# Patient Record
Sex: FEMALE | Race: BLACK OR AFRICAN AMERICAN | NOT HISPANIC OR LATINO | Employment: FULL TIME | URBAN - METROPOLITAN AREA
[De-identification: names, ages, dates, MRNs, and addresses within clinical notes are randomized per-mention and may not be internally consistent; named-entity substitution may affect disease eponyms.]

---

## 2023-06-30 ENCOUNTER — OFFICE VISIT (OUTPATIENT)
Dept: PODIATRY | Facility: CLINIC | Age: 68
End: 2023-06-30

## 2023-06-30 VITALS
HEART RATE: 103 BPM | WEIGHT: 200.88 LBS | RESPIRATION RATE: 18 BRPM | BODY MASS INDEX: 32.28 KG/M2 | SYSTOLIC BLOOD PRESSURE: 124 MMHG | DIASTOLIC BLOOD PRESSURE: 75 MMHG | HEIGHT: 66 IN

## 2023-06-30 DIAGNOSIS — L84 PRE-ULCERATIVE CALLUSES: ICD-10-CM

## 2023-06-30 DIAGNOSIS — L60.8 ACQUIRED DYSMORPHIC TOENAIL: ICD-10-CM

## 2023-06-30 DIAGNOSIS — M79.675 TOE PAIN, LEFT: ICD-10-CM

## 2023-06-30 DIAGNOSIS — B35.1 ONYCHOMYCOSIS OF TOENAIL: ICD-10-CM

## 2023-06-30 DIAGNOSIS — E11.9 CONTROLLED TYPE 2 DIABETES MELLITUS WITHOUT COMPLICATION, WITHOUT LONG-TERM CURRENT USE OF INSULIN: ICD-10-CM

## 2023-06-30 DIAGNOSIS — M20.62 ACQUIRED DEFORMITY OF TOE, LEFT: ICD-10-CM

## 2023-06-30 DIAGNOSIS — E11.9 COMPREHENSIVE DIABETIC FOOT EXAMINATION, TYPE 2 DM, ENCOUNTER FOR: Primary | ICD-10-CM

## 2023-06-30 PROCEDURE — 99203 PR OFFICE/OUTPT VISIT, NEW, LEVL III, 30-44 MIN: ICD-10-PCS | Mod: S$PBB,,, | Performed by: PODIATRIST

## 2023-06-30 PROCEDURE — 99204 OFFICE O/P NEW MOD 45 MIN: CPT | Mod: PBBFAC | Performed by: PODIATRIST

## 2023-06-30 PROCEDURE — 99999 PR PBB SHADOW E&M-NEW PATIENT-LVL IV: ICD-10-PCS | Mod: PBBFAC,,, | Performed by: PODIATRIST

## 2023-06-30 PROCEDURE — 99999 PR PBB SHADOW E&M-NEW PATIENT-LVL IV: CPT | Mod: PBBFAC,,, | Performed by: PODIATRIST

## 2023-06-30 PROCEDURE — 99203 OFFICE O/P NEW LOW 30 MIN: CPT | Mod: S$PBB,,, | Performed by: PODIATRIST

## 2023-06-30 RX ORDER — AMLODIPINE BESYLATE 10 MG/1
10 TABLET ORAL
COMMUNITY
Start: 2023-04-21

## 2023-06-30 RX ORDER — FERROUS SULFATE 325(65) MG
325 TABLET ORAL
COMMUNITY
End: 2023-12-21

## 2023-06-30 RX ORDER — ASPIRIN 81 MG/1
81 TABLET ORAL
COMMUNITY

## 2023-06-30 RX ORDER — ATORVASTATIN CALCIUM 20 MG/1
20 TABLET, FILM COATED ORAL NIGHTLY
COMMUNITY
Start: 2023-06-28

## 2023-06-30 RX ORDER — HYDROCHLOROTHIAZIDE 12.5 MG/1
12.5 CAPSULE ORAL
COMMUNITY
Start: 2022-04-27 | End: 2023-07-04

## 2023-06-30 RX ORDER — METFORMIN HYDROCHLORIDE 500 MG/1
500 TABLET ORAL 2 TIMES DAILY WITH MEALS
COMMUNITY
Start: 2022-04-28 | End: 2025-01-21

## 2023-06-30 RX ORDER — MULTIVITAMIN
TABLET ORAL
COMMUNITY
End: 2023-07-04 | Stop reason: SDUPTHER

## 2023-06-30 RX ORDER — DEXTROSE 4 G
1 TABLET,CHEWABLE ORAL
COMMUNITY
Start: 2021-10-25 | End: 2024-07-20

## 2023-06-30 RX ORDER — PHENYLEPHRINE HCL 10 MG
500 TABLET ORAL
COMMUNITY

## 2023-07-04 PROBLEM — L84 PRE-ULCERATIVE CALLUSES: Status: ACTIVE | Noted: 2023-07-04

## 2023-07-04 PROBLEM — E11.9 CONTROLLED TYPE 2 DIABETES MELLITUS WITHOUT COMPLICATION, WITHOUT LONG-TERM CURRENT USE OF INSULIN: Status: ACTIVE | Noted: 2023-07-04

## 2023-07-04 PROBLEM — M20.62: Status: ACTIVE | Noted: 2023-07-04

## 2023-07-04 NOTE — PROGRESS NOTES
Subjective:       Patient ID: Leonie Muir is a 67 y.o. female.    Chief Complaint: Nail Problem, Diabetes Mellitus, and Toe Pain (left)  Patient presents via referral PCP for diabetic foot exam, pain 2nd digit left foot, nail problem left greater than right foot.  Relates mild pain of the 2nd toe, left great toenail is broken and looks painful but currently no pain.  Denies drainage or bleeding from this area.  States she is been diabetic for a long time, feels it is well controlled, does not know her A1c.  Had a stroke 2022, relates she has been slowly improving, almost back to normal activity.  Did affect her left side.  No previous history of foot sores or infections    Past Medical History:   Diagnosis Date    Diabetes mellitus, type 2     Hyperlipidemia     Hypertension     Stroke      Past Surgical History:   Procedure Laterality Date     SECTION       Family History   Problem Relation Age of Onset    Diabetes Mother     Psychosis Father     Diabetes Sister     Diabetes Brother      Social History     Socioeconomic History    Marital status:    Tobacco Use    Smoking status: Never    Smokeless tobacco: Never   Substance and Sexual Activity    Alcohol use: Not Currently    Drug use: Not Currently    Sexual activity: Not Currently       Current Outpatient Medications   Medication Sig Dispense Refill    amLODIPine (NORVASC) 10 MG tablet Take 10 mg by mouth.      aspirin (ECOTRIN) 81 MG EC tablet Take 81 mg by mouth.      atorvastatin (LIPITOR) 20 MG tablet Take 20 mg by mouth every evening.      blood-glucose meter Misc 1 kit by NOT APPLICABLE route.      calcium carbonate/vitamin D3 (CALCIUM 600 + D,3, ORAL) Take 1 tablet by mouth Daily.      cinnamon bark 500 mg capsule Take 500 mg by mouth.      ELDERBERRY FRUIT ORAL Take by mouth.      metFORMIN (GLUCOPHAGE) 500 MG tablet Take 500 mg by mouth 2 (two) times daily with meals.      ferrous sulfate (FEOSOL) 325 mg (65 mg iron) Tab  "tablet Take 325 mg by mouth with breakfast.       No current facility-administered medications for this visit.     Review of patient's allergies indicates:   Allergen Reactions    Metoprolol Itching and Rash     Pt stated she just started this medication and is itching and has a new rash on her back.        Review of Systems   Cardiovascular:  Negative for leg swelling.   All other systems reviewed and are negative.    Objective:      Vitals:    06/30/23 0953   BP: 124/75   Pulse: 103   Resp: 18   Weight: 91.1 kg (200 lb 14.4 oz)   Height: 5' 5.7" (1.669 m)     Physical Exam  Vitals and nursing note reviewed.   Constitutional:       General: She is not in acute distress.     Appearance: Normal appearance.   Cardiovascular:      Pulses:           Dorsalis pedis pulses are 2+ on the right side and 2+ on the left side.        Posterior tibial pulses are 1+ on the right side and 1+ on the left side.   Pulmonary:      Effort: Pulmonary effort is normal.   Musculoskeletal:         General: Swelling and tenderness present.      Right foot: Normal range of motion. Deformity (2nd digit left) present.      Left foot: Normal range of motion.      Comments: Pain edema 2nd digit left/acquired deformity digit.  Muscle strength is intact against resistance in all planes bilateral feet   Feet:      Right foot:      Protective Sensation: 6 sites tested.  6 sites sensed.      Skin integrity: Ulcer, callus (Painful raised pre ulcerative callus lesion medial 2nd digit left with edema, no skin opening or calor) and dry skin present.      Toenail Condition: Right toenails are abnormally thick. Fungal disease present.     Left foot:      Protective Sensation: 6 sites tested.  6 sites sensed.      Skin integrity: Dry skin (onychogryphosis, onychlysis with fungal involvement, raised, tented damaged hallux nails) present.      Toenail Condition: Left toenails are abnormally thick. Fungal disease present.  Skin:     Capillary Refill: " Capillary refill takes 2 to 3 seconds.   Neurological:      General: No focal deficit present.      Mental Status: She is alert.      Comments: Sensation intact all areas bilateral feet, no paresthesias bilateral feet   Psychiatric:         Mood and Affect: Mood normal.         Behavior: Behavior normal.         Thought Content: Thought content normal.         Judgment: Judgment normal.                          Assessment:       1. Comprehensive diabetic foot examination, type 2 DM, encounter for    2. Controlled type 2 diabetes mellitus without complication, without long-term current use of insulin    3. Acquired deformity of toe, left    4. Toe pain, left    5. Pre-ulcerative calluses - Left Foot    6. Acquired dysmorphic toenail    7. Onychomycosis of toenail        Plan:           Comprehensive diabetic pedal exam performed  Reviewed sensation in feet  Reviewed signs of neuropathy.   Reviewed diabetic education at length and advised patient important for her to know her A1c especially if diabetes is well controlled   Reviewed benefit of controled glucose/diabetes regarding potential foot problems especially neuropathy.    Reviewed deformity 2nd digit, pre ulcerative callus putting her at high risk for developing an open ulcer and infection.  Area was debrided with no skin opening, had a lengthy discussion regarding cushion, padding, wide appropriate shoes to accommodate this area as well as a toe spacer.  Anything used between these toes needs to be soft, avoid moisture, keep the area clean and dry.  This may additionally have occurred due to problem with the left big toenail.  Monitor closely for any changes and contact the office with any concerns  Reviewed appropriate shoes,  especially indoors to protect feet, no flat shoes, slippers or walking in sock or bare feet  Had a lengthy discussion regarding maintenance of dry skin and damaged fungal nails and potential complications  Nails debrided, thickness  reduced.  We discussed multiple topical treatments for skin and nails, needs to be applied few times a week for skin  Discussed treatments for nails as well as soaking regimens.  File nails on a regular basis to keep flat   Reviewed need for daily foot checks and instructed patient to contact the office with any area of redness or swelling which has not improved within 3 days.  Patient was in understanding and agreement with treatment plan.  I counseled the patient on their conditions, implications and medical management.  Instructed patient/family to contact the office with any changes, questions, concerns, worsening of symptoms.   Total face to face time 30 minutes, exam, assessment, treatment, discussion, additional time for review of chart prior to and following appointment and visit documentation, consultation and coordination of care.    Follow up 3 months    This note was created using M*Modal voice recognition software that occasionally misinterpreted phrases or words.

## 2023-09-15 ENCOUNTER — TELEPHONE (OUTPATIENT)
Dept: PODIATRY | Facility: CLINIC | Age: 68
End: 2023-09-15
Payer: MEDICARE

## 2023-09-15 NOTE — TELEPHONE ENCOUNTER
Patient's endocrinologist no longer takes medicaid. Patient would like a referral to see Denise Rasmussen

## 2023-09-19 ENCOUNTER — OFFICE VISIT (OUTPATIENT)
Dept: PODIATRY | Facility: CLINIC | Age: 68
End: 2023-09-19
Payer: MEDICARE

## 2023-09-19 VITALS
HEIGHT: 66 IN | SYSTOLIC BLOOD PRESSURE: 150 MMHG | HEART RATE: 83 BPM | DIASTOLIC BLOOD PRESSURE: 77 MMHG | BODY MASS INDEX: 32.27 KG/M2 | WEIGHT: 200.81 LBS

## 2023-09-19 DIAGNOSIS — B35.1 ONYCHOMYCOSIS OF TOENAIL: ICD-10-CM

## 2023-09-19 DIAGNOSIS — L84 PRE-ULCERATIVE CALLUSES: ICD-10-CM

## 2023-09-19 DIAGNOSIS — E11.9 CONTROLLED TYPE 2 DIABETES MELLITUS WITHOUT COMPLICATION, WITHOUT LONG-TERM CURRENT USE OF INSULIN: ICD-10-CM

## 2023-09-19 DIAGNOSIS — M20.62 ACQUIRED DEFORMITY OF TOE, LEFT: Primary | ICD-10-CM

## 2023-09-19 PROCEDURE — 99214 OFFICE O/P EST MOD 30 MIN: CPT | Mod: PBBFAC | Performed by: PODIATRIST

## 2023-09-19 PROCEDURE — 99999 PR PBB SHADOW E&M-EST. PATIENT-LVL IV: ICD-10-PCS | Mod: PBBFAC,,, | Performed by: PODIATRIST

## 2023-09-19 PROCEDURE — 99213 OFFICE O/P EST LOW 20 MIN: CPT | Mod: S$PBB,,, | Performed by: PODIATRIST

## 2023-09-19 PROCEDURE — 99999 PR PBB SHADOW E&M-EST. PATIENT-LVL IV: CPT | Mod: PBBFAC,,, | Performed by: PODIATRIST

## 2023-09-19 PROCEDURE — 99213 PR OFFICE/OUTPT VISIT, EST, LEVL III, 20-29 MIN: ICD-10-PCS | Mod: S$PBB,,, | Performed by: PODIATRIST

## 2023-09-22 NOTE — PROGRESS NOTES
Subjective:       Patient ID: Leonie Muir is a 67 y.o. female.    Chief Complaint: No chief complaint on file.  Patient presents for follow-up due to type 2 diabetes, pre ulcerative callus.  Patient relates change with insurance/provider and would like a referral to endocrinology, nurse practitioner with [facility].  Patient relates she stays busy.  She used to do more patient care, now she usually visits them a few times a week.  Suffered strokes 2022 and tries to stay active and busy to keep up her strength.  She feels diabetes is well controlled but this is 1 of the reason for referral to endocrinology, she is not quite sure.      Past Medical History:   Diagnosis Date    Diabetes mellitus, type 2     Hyperlipidemia     Hypertension     Stroke      Past Surgical History:   Procedure Laterality Date     SECTION       Family History   Problem Relation Age of Onset    Diabetes Mother     Psychosis Father     Diabetes Sister     Diabetes Brother      Social History     Socioeconomic History    Marital status:    Tobacco Use    Smoking status: Never    Smokeless tobacco: Never   Substance and Sexual Activity    Alcohol use: Not Currently    Drug use: Not Currently    Sexual activity: Not Currently       Current Outpatient Medications   Medication Sig Dispense Refill    amLODIPine (NORVASC) 10 MG tablet Take 10 mg by mouth.      aspirin (ECOTRIN) 81 MG EC tablet Take 81 mg by mouth.      atorvastatin (LIPITOR) 20 MG tablet Take 20 mg by mouth every evening.      blood-glucose meter Misc 1 kit by NOT APPLICABLE route.      calcium carbonate/vitamin D3 (CALCIUM 600 + D,3, ORAL) Take 1 tablet by mouth Daily.      cinnamon bark 500 mg capsule Take 500 mg by mouth.      ELDERBERRY FRUIT ORAL Take by mouth.      ferrous sulfate (FEOSOL) 325 mg (65 mg iron) Tab tablet Take 325 mg by mouth with breakfast.      metFORMIN (GLUCOPHAGE) 500 MG tablet Take 500 mg by mouth 2 (two) times  "daily with meals.       No current facility-administered medications for this visit.     Review of patient's allergies indicates:   Allergen Reactions    Metoprolol Itching and Rash     Pt stated she just started this medication and is itching and has a new rash on her back.        Review of Systems   Cardiovascular:  Negative for leg swelling.   Musculoskeletal:  Negative for gait problem.   All other systems reviewed and are negative.      Objective:      Vitals:    09/19/23 1404   BP: (!) 150/77   Pulse: 83   Weight: 91.1 kg (200 lb 12.8 oz)   Height: 5' 5.7" (1.669 m)     Physical Exam  Vitals and nursing note reviewed.   Constitutional:       General: She is not in acute distress.     Appearance: Normal appearance.   Cardiovascular:      Pulses:           Dorsalis pedis pulses are 2+ on the right side and 2+ on the left side.        Posterior tibial pulses are 1+ on the right side and 1+ on the left side.   Pulmonary:      Effort: Pulmonary effort is normal.   Musculoskeletal:      Right foot: Decreased range of motion. Deformity (2nd digit left) present.      Left foot: Decreased range of motion.   Feet:      Right foot:      Skin integrity: Dry skin present.      Toenail Condition: Right toenails are abnormally thick. Fungal disease present.     Left foot:      Skin integrity: Callus (Preulcerative callus distal hallux left) and dry skin present.      Toenail Condition: Left toenails are abnormally thick. Fungal disease present.  Skin:     Capillary Refill: Capillary refill takes 2 to 3 seconds.   Neurological:      General: No focal deficit present.      Mental Status: She is alert.      Comments: Sensation intact all areas bilateral feet, no paresthesias bilateral feet   Psychiatric:         Behavior: Behavior normal.         Thought Content: Thought content normal.                        Assessment:       1. Acquired deformity of toe, left    2. Pre-ulcerative calluses - Left Foot    3. Controlled type 2 " diabetes mellitus without complication, without long-term current use of insulin    4. Onychomycosis of toenail        Plan:         REFERRAL ENDOCRINOLOGY-Denise Rasmussen      Reviewed diabetic education at length   Reviewed diabetic neuropathy   Reviewed benefit of controled glucose/diabetes regarding potential foot problems especially neuropathy.    We discussed referral to endocrinology  Advised patient with her diabetes in her age she needs to take better care of her feet  Reviewed need for better appropriate shoes,  especially indoors to protect feet, no flat shoes, slippers or walking in sock or bare feet  Reviewed care and maintenance of dry skin and damaged fungal nails and potential complications  Nails debrided, thickness reduced.  Again we discussed multiple topical treatments for nails and explained to patient it is important she tried to treat this due to the amount of damage to the nail bed, surrounding skin which puts her at risk for complications  File nails on a regular basis to keep flat   Reviewed need for daily foot checks and instructed patient to contact the office with any area of redness or swelling which has not improved within 3 days.  Patient was in understanding and agreement with treatment plan.  I counseled the patient on their conditions, implications and medical management.  Instructed patient to contact the office with any changes, questions, concerns, worsening of symptoms.   Total face to face time 20 minutes, exam, assessment, treatment, discussion, additional time for review of chart prior to and following appointment and visit documentation, consultation and coordination of care.    Follow up 3 months    This note was created using M*Modal voice recognition software that occasionally misinterpreted phrases or words.

## 2023-12-19 ENCOUNTER — OFFICE VISIT (OUTPATIENT)
Dept: PODIATRY | Facility: CLINIC | Age: 68
End: 2023-12-19
Payer: MEDICAID

## 2023-12-19 VITALS
DIASTOLIC BLOOD PRESSURE: 84 MMHG | WEIGHT: 205.13 LBS | HEIGHT: 67 IN | HEART RATE: 86 BPM | BODY MASS INDEX: 32.2 KG/M2 | RESPIRATION RATE: 18 BRPM | SYSTOLIC BLOOD PRESSURE: 137 MMHG

## 2023-12-19 DIAGNOSIS — M20.42 HAMMERTOES OF BOTH FEET: ICD-10-CM

## 2023-12-19 DIAGNOSIS — M19.071 ARTHRITIS OF BOTH FEET: Primary | ICD-10-CM

## 2023-12-19 DIAGNOSIS — M20.41 HAMMERTOES OF BOTH FEET: ICD-10-CM

## 2023-12-19 DIAGNOSIS — E11.9 CONTROLLED TYPE 2 DIABETES MELLITUS WITHOUT COMPLICATION, WITHOUT LONG-TERM CURRENT USE OF INSULIN: ICD-10-CM

## 2023-12-19 DIAGNOSIS — B35.1 ONYCHOMYCOSIS OF TOENAIL: ICD-10-CM

## 2023-12-19 DIAGNOSIS — M19.072 ARTHRITIS OF BOTH FEET: Primary | ICD-10-CM

## 2023-12-19 PROCEDURE — 99214 OFFICE O/P EST MOD 30 MIN: CPT | Mod: PBBFAC | Performed by: PODIATRIST

## 2023-12-19 PROCEDURE — 99999 PR PBB SHADOW E&M-EST. PATIENT-LVL IV: CPT | Mod: PBBFAC,,, | Performed by: PODIATRIST

## 2023-12-19 PROCEDURE — 1160F PR REVIEW ALL MEDS BY PRESCRIBER/CLIN PHARMACIST DOCUMENTED: ICD-10-PCS | Mod: CPTII,,, | Performed by: PODIATRIST

## 2023-12-19 PROCEDURE — 99213 PR OFFICE/OUTPT VISIT, EST, LEVL III, 20-29 MIN: ICD-10-PCS | Mod: S$PBB,,, | Performed by: PODIATRIST

## 2023-12-19 PROCEDURE — 3008F PR BODY MASS INDEX (BMI) DOCUMENTED: ICD-10-PCS | Mod: CPTII,,, | Performed by: PODIATRIST

## 2023-12-19 PROCEDURE — 99213 OFFICE O/P EST LOW 20 MIN: CPT | Mod: S$PBB,,, | Performed by: PODIATRIST

## 2023-12-19 PROCEDURE — 1159F PR MEDICATION LIST DOCUMENTED IN MEDICAL RECORD: ICD-10-PCS | Mod: CPTII,,, | Performed by: PODIATRIST

## 2023-12-19 PROCEDURE — 1101F PR PT FALLS ASSESS DOC 0-1 FALLS W/OUT INJ PAST YR: ICD-10-PCS | Mod: CPTII,,, | Performed by: PODIATRIST

## 2023-12-19 PROCEDURE — 1126F PR PAIN SEVERITY QUANTIFIED, NO PAIN PRESENT: ICD-10-PCS | Mod: CPTII,,, | Performed by: PODIATRIST

## 2023-12-19 PROCEDURE — 3075F PR MOST RECENT SYSTOLIC BLOOD PRESS GE 130-139MM HG: ICD-10-PCS | Mod: CPTII,,, | Performed by: PODIATRIST

## 2023-12-19 PROCEDURE — 3079F PR MOST RECENT DIASTOLIC BLOOD PRESSURE 80-89 MM HG: ICD-10-PCS | Mod: CPTII,,, | Performed by: PODIATRIST

## 2023-12-19 PROCEDURE — 99999 PR PBB SHADOW E&M-EST. PATIENT-LVL IV: ICD-10-PCS | Mod: PBBFAC,,, | Performed by: PODIATRIST

## 2023-12-19 PROCEDURE — 3288F PR FALLS RISK ASSESSMENT DOCUMENTED: ICD-10-PCS | Mod: CPTII,,, | Performed by: PODIATRIST

## 2023-12-19 RX ORDER — HYDROCODONE BITARTRATE AND ACETAMINOPHEN 5; 325 MG/1; MG/1
1 TABLET ORAL EVERY 6 HOURS
COMMUNITY
Start: 2023-11-29

## 2023-12-19 RX ORDER — FERROUS FUMARATE 324(106)MG
1 TABLET ORAL
COMMUNITY
Start: 2023-10-03

## 2023-12-20 ENCOUNTER — TELEPHONE (OUTPATIENT)
Dept: PODIATRY | Facility: CLINIC | Age: 68
End: 2023-12-20
Payer: MEDICARE

## 2023-12-21 ENCOUNTER — TELEPHONE (OUTPATIENT)
Dept: PODIATRY | Facility: CLINIC | Age: 68
End: 2023-12-21
Payer: MEDICARE

## 2023-12-21 NOTE — PROGRESS NOTES
Subjective:       Patient ID: Leonie Muir is a 68 y.o. female.    Chief Complaint: Follow-up, Callouses, and Diabetes Mellitus  Patient presents for follow-up due to type 2 diabetes, pre ulcerative callus distal left hallux.  Patient relates area has been much better.  Has been applying Vicks vapor rub to nails and callus as well  Relates diabetes is unchanged.  Saw Denise Rasmussen, NP/endocrinology and was told her diabetes is well-controlled, the no medication changes and patient was pleased.  Denies burning or tingling in feet.  No foot pain today  Strokes 2022       Past Medical History:   Diagnosis Date    Diabetes mellitus, type 2     Hyperlipidemia     Hypertension     Stroke      Past Surgical History:   Procedure Laterality Date     SECTION       Family History   Problem Relation Age of Onset    Diabetes Mother     Psychosis Father     Diabetes Sister     Diabetes Brother      Social History     Socioeconomic History    Marital status:    Tobacco Use    Smoking status: Never    Smokeless tobacco: Never   Substance and Sexual Activity    Alcohol use: Not Currently    Drug use: Not Currently    Sexual activity: Not Currently       Current Outpatient Medications   Medication Sig Dispense Refill    amLODIPine (NORVASC) 10 MG tablet Take 10 mg by mouth.      aspirin (ECOTRIN) 81 MG EC tablet Take 81 mg by mouth.      atorvastatin (LIPITOR) 20 MG tablet Take 20 mg by mouth every evening.      blood-glucose meter Misc 1 kit by NOT APPLICABLE route.      cinnamon bark 500 mg capsule Take 500 mg by mouth.      ELDERBERRY FRUIT ORAL Take by mouth.      FERROCITE 324 mg (106 mg iron) Tab Take 1 tablet by mouth 3 (three) times a week.      HYDROcodone-acetaminophen (NORCO) 5-325 mg per tablet Take 1 tablet by mouth every 6 (six) hours.      metFORMIN (GLUCOPHAGE) 500 MG tablet Take 500 mg by mouth 2 (two) times daily with meals.       No current facility-administered medications for this  "visit.     Review of patient's allergies indicates:   Allergen Reactions    Metoprolol Itching and Rash     Pt stated she just started this medication and is itching and has a new rash on her back.        Review of Systems   Cardiovascular:  Positive for leg swelling.   Musculoskeletal:  Negative for gait problem.   All other systems reviewed and are negative.      Objective:      Vitals:    12/19/23 0851   BP: 137/84   Pulse: 86   Resp: 18   Weight: 93 kg (205 lb 1.6 oz)   Height: 5' 7" (1.702 m)     Physical Exam  Vitals and nursing note reviewed.   Constitutional:       General: She is not in acute distress.     Appearance: Normal appearance.   Cardiovascular:      Pulses:           Dorsalis pedis pulses are 2+ on the right side and 2+ on the left side.        Posterior tibial pulses are 1+ on the right side and 1+ on the left side.   Pulmonary:      Effort: Pulmonary effort is normal.   Musculoskeletal:      Right foot: Decreased range of motion (Significant limitation range of motion 1st MPJ bilateral, hammertoes bilateral/2nd digit rigid bilateral, arthritic and degenerative changes forefoot bilateral). Deformity present.      Left foot: Decreased range of motion. Deformity present.   Feet:      Right foot:      Skin integrity: Dry skin present.      Toenail Condition: Right toenails are abnormally thick. Fungal disease present.     Left foot:      Skin integrity: Callus (Preulcerative callus distal hallux left) and dry skin present.      Toenail Condition: Left toenails are abnormally thick. Fungal disease present.  Skin:     Capillary Refill: Capillary refill takes 2 to 3 seconds.   Neurological:      General: No focal deficit present.      Mental Status: She is alert.      Comments: Sensation intact all areas bilateral feet, no paresthesias bilateral feet   Psychiatric:         Behavior: Behavior normal.         Thought Content: Thought content normal.                                  Assessment:       1. " Arthritis of both feet    2. Hammertoes of both feet    3. Controlled type 2 diabetes mellitus without complication, without long-term current use of insulin    4. Onychomycosis of toenail          Plan:         Reviewed arthritis in feet, limited range of motion big toe joints and hammertoes at length and explained this is what contributed to the callus on the tip of the left great toe.  At this time it is completely resolved on this toe.  Pointed out along the medial right great toe thickened skin starting to develop a callus in this location, make sure to address this with the Vicks vapor rub as well.  Highly recommend it be applied twice daily over the next few months  Reviewed potential complications regarding these areas since they are due to pressure  Had a lengthy discussion regarding care and maintenance of skin  Reviewed shoe gear at length both indoors and outdoors  Nails debrided, thickness reduced  Reviewed diabetic education pertaining to feet and monitoring for any sensations of early neuropathy  Reviewed need for daily foot checks and instructed patient to contact the office with any area of redness or swelling which has not improved within a few days.  Patient was in understanding and agreement with treatment plan.  I counseled the patient on their conditions, implications and medical management.  Instructed patient to contact the office with any changes, questions, concerns, worsening of symptoms.   Total face to face time 20 minutes, exam, assessment, treatment, discussion, additional time for review of chart prior to and following appointment and visit documentation, consultation and coordination of care.    Follow up 3 months    This note was created using M*Modal voice recognition software that occasionally misinterpreted phrases or words.

## 2023-12-21 NOTE — TELEPHONE ENCOUNTER
Patient requesting name of NP that she sees for endocrinology. Denise Rasmussen was given with phone number.

## 2023-12-21 NOTE — TELEPHONE ENCOUNTER
I reviewed patient's chart and I have never prescribed pain medication for her.  The last prescription she received was from her dentist on 11/29.  I can not refill this medication.  Thanks

## 2024-09-09 ENCOUNTER — TELEPHONE (OUTPATIENT)
Dept: PODIATRY | Facility: CLINIC | Age: 69
End: 2024-09-09
Payer: MEDICARE

## 2024-09-09 NOTE — TELEPHONE ENCOUNTER
Patient was scheduled incorrectly with Dr. Tate Vo. She typically sees Dr. Cheryl Vo. Patient was moved to Dr. Luna's schedule and has an appointments scheduled tomorrow, 09/10 at 2:15 p.m. [facility] 09/09/2024

## 2024-09-09 NOTE — TELEPHONE ENCOUNTER
----- Message from Tate Vo DPM sent at 9/8/2024  8:10 PM CDT -----  Patient is on my schedule and it says for nails this is Cheryl's patient this is not anybody I have ever seen and would recommend changing her to Cheryl schedule.

## 2025-04-22 ENCOUNTER — OFFICE VISIT (OUTPATIENT)
Dept: PODIATRY | Facility: CLINIC | Age: 70
End: 2025-04-22
Payer: MEDICARE

## 2025-04-22 VITALS
WEIGHT: 200.63 LBS | HEIGHT: 67 IN | SYSTOLIC BLOOD PRESSURE: 137 MMHG | RESPIRATION RATE: 18 BRPM | BODY MASS INDEX: 31.49 KG/M2 | DIASTOLIC BLOOD PRESSURE: 74 MMHG | HEART RATE: 86 BPM

## 2025-04-22 DIAGNOSIS — E11.9 CONTROLLED TYPE 2 DIABETES MELLITUS WITHOUT COMPLICATION, WITHOUT LONG-TERM CURRENT USE OF INSULIN: ICD-10-CM

## 2025-04-22 DIAGNOSIS — L84 PRE-ULCERATIVE CALLUSES: ICD-10-CM

## 2025-04-22 DIAGNOSIS — M20.41 HAMMERTOES OF BOTH FEET: ICD-10-CM

## 2025-04-22 DIAGNOSIS — M19.071 ARTHRITIS OF BOTH FEET: ICD-10-CM

## 2025-04-22 DIAGNOSIS — E11.9 COMPREHENSIVE DIABETIC FOOT EXAMINATION, TYPE 2 DM, ENCOUNTER FOR: Primary | ICD-10-CM

## 2025-04-22 DIAGNOSIS — M20.5X1 ACQUIRED ADDUCTOVARUS ROTATION OF TOE OF RIGHT FOOT: ICD-10-CM

## 2025-04-22 DIAGNOSIS — M19.072 ARTHRITIS OF BOTH FEET: ICD-10-CM

## 2025-04-22 DIAGNOSIS — M20.42 HAMMERTOES OF BOTH FEET: ICD-10-CM

## 2025-04-22 DIAGNOSIS — B35.1 ONYCHOMYCOSIS OF TOENAIL: ICD-10-CM

## 2025-04-22 PROCEDURE — 1160F RVW MEDS BY RX/DR IN RCRD: CPT | Mod: CPTII,S$GLB,, | Performed by: PODIATRIST

## 2025-04-22 PROCEDURE — 1101F PT FALLS ASSESS-DOCD LE1/YR: CPT | Mod: CPTII,S$GLB,, | Performed by: PODIATRIST

## 2025-04-22 PROCEDURE — 99214 OFFICE O/P EST MOD 30 MIN: CPT | Mod: S$GLB,,, | Performed by: PODIATRIST

## 2025-04-22 PROCEDURE — 1126F AMNT PAIN NOTED NONE PRSNT: CPT | Mod: CPTII,S$GLB,, | Performed by: PODIATRIST

## 2025-04-22 PROCEDURE — 3078F DIAST BP <80 MM HG: CPT | Mod: CPTII,S$GLB,, | Performed by: PODIATRIST

## 2025-04-22 PROCEDURE — 3075F SYST BP GE 130 - 139MM HG: CPT | Mod: CPTII,S$GLB,, | Performed by: PODIATRIST

## 2025-04-22 PROCEDURE — 4010F ACE/ARB THERAPY RXD/TAKEN: CPT | Mod: CPTII,S$GLB,, | Performed by: PODIATRIST

## 2025-04-22 PROCEDURE — 1159F MED LIST DOCD IN RCRD: CPT | Mod: CPTII,S$GLB,, | Performed by: PODIATRIST

## 2025-04-22 PROCEDURE — 3288F FALL RISK ASSESSMENT DOCD: CPT | Mod: CPTII,S$GLB,, | Performed by: PODIATRIST

## 2025-04-22 PROCEDURE — 3008F BODY MASS INDEX DOCD: CPT | Mod: CPTII,S$GLB,, | Performed by: PODIATRIST

## 2025-04-22 PROCEDURE — 99999 PR PBB SHADOW E&M-EST. PATIENT-LVL IV: CPT | Mod: PBBFAC,,, | Performed by: PODIATRIST

## 2025-04-22 RX ORDER — METOPROLOL SUCCINATE 25 MG/1
25 TABLET, EXTENDED RELEASE ORAL
COMMUNITY
Start: 2025-04-15

## 2025-04-22 RX ORDER — OLMESARTAN MEDOXOMIL 20 MG/1
20 TABLET ORAL
COMMUNITY
Start: 2024-05-28

## 2025-04-22 RX ORDER — TIRZEPATIDE 2.5 MG/.5ML
2.5 INJECTION, SOLUTION SUBCUTANEOUS
COMMUNITY
Start: 2025-04-17 | End: 2025-04-22

## 2025-04-22 RX ORDER — FLASH GLUCOSE SCANNING READER
EACH MISCELLANEOUS
COMMUNITY
Start: 2025-03-19 | End: 2025-04-22 | Stop reason: SDUPTHER

## 2025-04-22 RX ORDER — GLIMEPIRIDE 2 MG/1
2 TABLET ORAL EVERY MORNING
COMMUNITY

## 2025-04-22 RX ORDER — CODEINE PHOSPHATE AND GUAIFENESIN 10; 100 MG/5ML; MG/5ML
10 SOLUTION ORAL EVERY 6 HOURS PRN
COMMUNITY
Start: 2025-02-04 | End: 2025-04-22

## 2025-04-22 RX ORDER — DAPAGLIFLOZIN 10 MG/1
10 TABLET, FILM COATED ORAL
COMMUNITY
Start: 2024-05-28

## 2025-04-22 RX ORDER — PREDNISONE 20 MG/1
20 TABLET ORAL 2 TIMES DAILY
COMMUNITY
Start: 2025-02-04 | End: 2025-04-22

## 2025-04-22 RX ORDER — AMLODIPINE AND OLMESARTAN MEDOXOMIL 10; 20 MG/1; MG/1
1 TABLET ORAL
COMMUNITY
End: 2025-04-22

## 2025-04-22 RX ORDER — ATORVASTATIN CALCIUM 20 MG/1
TABLET, FILM COATED ORAL
COMMUNITY
Start: 2025-03-18

## 2025-04-22 RX ORDER — FLASH GLUCOSE SENSOR
KIT MISCELLANEOUS
COMMUNITY
Start: 2025-03-19

## 2025-04-22 NOTE — PROGRESS NOTES
"Subjective:       Patient ID: Leonie Muir is a 69 y.o. female.    Chief Complaint: Diabetic Foot Exam  Patient presents for annual diabetic foot exam, problem/callus 5th digit right  Has been using a corn patch on it, still has a lot of hard skin which gets sore in her tennis shoes but not painful  She has not been wearing any other shoes but her tennis shoes  Relates diabetes has been high, does not know her A1c but is following up Denise Rasmussen, NP/endocrinology, has been some medication changes starting a new "shot, and is now using a Ernesto monitoring system  Has been diabetic for about 3 years  Tries to stay as active as possible, has not been able to work since her stroke  Denies burning or tingling in feet  No foot pain today      Past Medical History:   Diagnosis Date    Diabetes mellitus, type 2     Hyperlipidemia     Hypertension     Stroke 2022     Past Surgical History:   Procedure Laterality Date     SECTION       Family History   Problem Relation Name Age of Onset    Diabetes Mother      Psychosis Father      Diabetes Sister      Diabetes Brother       Social History     Socioeconomic History    Marital status:    Tobacco Use    Smoking status: Never    Smokeless tobacco: Never   Substance and Sexual Activity    Alcohol use: Not Currently    Drug use: Not Currently    Sexual activity: Not Currently       Current Outpatient Medications   Medication Sig Dispense Refill    amLODIPine (NORVASC) 10 MG tablet Take 10 mg by mouth.      aspirin (ECOTRIN) 81 MG EC tablet Take 81 mg by mouth.      atorvastatin (LIPITOR) 20 MG tablet = 1 tab, Oral, HS, # 90 tab, 1 Refill(s), Maintenance, Pharmacy: [facility], 172.7, cm, 25 8:36:00 CDT, Height/Length Measured, 93.2, kg, 25 8:36:00 CDT, Weight Dosing      blood-glucose sensor (FREESTYLE ERNESTO 2 PLUS SENSOR [facility] [place] [facility]) Freestyle Ernesto 2 Sensors, See Instructions, Freestyle Ernesto 2 Sensors  14 day use  Diagnosis Type 2 Diabetes " E11.9, # 6 EA, 4 Refill(s), Pharmacy: [facility], 172.7, cm, 11/04/24 8:21:00 CST, Height/Length Measured, 92.1, kg, 11/04/24 8:21:00 CST, Weight Dosing      cinnamon bark 500 mg capsule Take 500 mg by mouth.      dapagliflozin propanediol (FARXIGA) 10 mg tablet 10 mg.      ELDERBERRY FRUIT ORAL Take by mouth.      flash glucose scanning reader (FREESTYLE SOPHIA 2 READER MISC) Freestyle Sophia 2 [place]/KIT, See Instructions, Freestyle Sophia 2 reader/KIT  Patient to test glucose  Diagnosis Type 2 diabets, # 1 EA, 0 Refill(s), Pharmacy: [facility], 172.7, cm, 11/04/24 8:21:00 CST, Height/Length Measured, 92.1, kg, 11/04/24 8:21:00 CST, Weight Dosing      FREESTYLE SOPHIA 2 SENSOR Kit USE 1 SENSOR AND CHANGE EVERY 14 DAYS      glimepiride (AMARYL) 2 MG tablet Take 2 mg by mouth every morning.      metoprolol succinate (TOPROL-XL) 25 MG 24 hr tablet Take 25 mg by mouth.      olmesartan (BENICAR) 20 MG tablet 20 mg.      amlodipine-olmesartan (JIA) 10-20 mg per tablet Take 1 tablet by mouth. (Patient not taking: Reported on 4/22/2025)      atorvastatin (LIPITOR) 20 MG tablet Take 20 mg by mouth every evening. (Patient not taking: Reported on 4/22/2025)      blood-glucose meter Misc 1 kit by NOT APPLICABLE route.      FERROCITE 324 mg (106 mg iron) Tab Take 1 tablet by mouth 3 (three) times a week. (Patient not taking: Reported on 4/22/2025)      FREESTYLE SOPHIA 2 READER Misc USE TO TEST BLOOD SUGAR (Patient not taking: Reported on 4/22/2025)      guaiFENesin-codeine 100-10 mg/5 ml (TUSSI-ORGANIDIN NR)  mg/5 mL syrup Take 10 mLs by mouth every 6 (six) hours as needed. (Patient not taking: Reported on 4/22/2025)      HYDROcodone-acetaminophen (NORCO) 5-325 mg per tablet Take 1 tablet by mouth every 6 (six) hours. (Patient not taking: Reported on 4/22/2025)      metFORMIN (GLUCOPHAGE) 500 MG tablet Take 500 mg by mouth 2 (two) times daily with meals.      predniSONE (DELTASONE) 20 MG tablet Take 20  "mg by mouth 2 (two) times daily. (Patient not taking: Reported on 4/22/2025)      tirzepatide (MOUNJARO) 2.5 mg/0.5 mL PnIj 2.5 mg. (Patient not taking: Reported on 4/22/2025)       No current facility-administered medications for this visit.     Review of patient's allergies indicates:   Allergen Reactions    Metoprolol Itching and Rash     Pt stated she just started this medication and is itching and has a new rash on her back.        Review of Systems   Cardiovascular:  Negative for leg swelling.   Musculoskeletal:  Positive for gait problem.        Cane   Neurological:  Positive for weakness.   All other systems reviewed and are negative.      Objective:      Vitals:    04/22/25 0830   BP: 137/74   Pulse: 86   Resp: 18   Weight: 91 kg (200 lb 9.9 oz)   Height: 5' 7" (1.702 m)     Physical Exam  Vitals and nursing note reviewed.   Constitutional:       General: She is not in acute distress.     Appearance: Normal appearance.   Cardiovascular:      Pulses:           Dorsalis pedis pulses are 2+ on the right side and 2+ on the left side.        Posterior tibial pulses are 1+ on the right side and 1+ on the left side.   Pulmonary:      Effort: Pulmonary effort is normal.   Musculoskeletal:      Right foot: Decreased range of motion (Significant limitation range of motion 1st MPJ bilateral, hammertoes bilateral/2nd digit rigid bilateral, arthritic and degenerative changes forefoot bilateral). Deformity (Adductovarus deformity 5th digit right) present.      Left foot: Decreased range of motion. Deformity present.   Feet:      Right foot:      Protective Sensation: 5 sites tested.  5 sites sensed.      Skin integrity: Callus (Preulcerative callus lateral 5th PIPJ) and dry skin present.      Toenail Condition: Right toenails are abnormally thick. Fungal disease present.     Left foot:      Protective Sensation: 5 sites tested.  5 sites sensed.      Skin integrity: Dry skin present.      Toenail Condition: Left toenails " are abnormally thick. Fungal disease present.  Skin:     Capillary Refill: Capillary refill takes 2 to 3 seconds.   Neurological:      General: No focal deficit present.      Mental Status: She is alert.      Comments: Sensation intact all areas bilateral feet, no pain/paresthesias bilateral feet   Psychiatric:         Behavior: Behavior normal.         Thought Content: Thought content normal.                         FROM CHEMISTRY PANEL [facility] 03/18/2025  Hgb A1c [0.0-5.9 %] 8.2 % 2  *H*  (3/18/25 9:08 AM)            Assessment:       1. Comprehensive diabetic foot examination, type 2 DM, encounter for    2. Controlled type 2 diabetes mellitus without complication, without long-term current use of insulin    3. Acquired adductovarus rotation of toe of right foot    4. Pre-ulcerative calluses - Left Foot    5. Arthritis of both feet    6. Hammertoes of both feet    7. Onychomycosis of toenail            Plan:           Comprehensive diabetic pedal exam performed  Reviewed good sensation in feet  We had a lengthy discussion regarding neuropathy, she is at higher risk with uncontrolled neuropathy, however she has only been diabetic for 3 years and advised patient if her nurse practitioner work at getting her A1c down she will have less chance of developing any complication of diabetic neuropathy  We did reviewed signs and symptoms to monitor for   Reviewed benefit of controled glucose/diabetes regarding potential foot problems especially healing and neuropathy  Reviewed A1c and patient understands diet changes need to be made in combination with medication for best results          Reviewed arthritis in feet, hammertoes  Reviewed rotation deformity 5th digit right and pressure causing a pre ulcerative callus  Explained what this means, an area of repetitive pressure/skin problem at risk for bruising or developing an open sore  Instructed patient to discontinue acid patches in this area  Area was debrided  with no complications at this time but we discussed at length what to monitor for regarding any changes  Light wrap of Coban applied and dispensed sample of Coban 2 patient  If this is comfortable apply light wrap of Coban each morning, always remove at night, this will help to prevent pressure  Pointed out to patient the callus cushion has caused circular area of depression/unusual shape due to the skin pushing through the donut hole part of the callus cushion which is supposed to offload pressure  It is recommended she discontinue these for nail  We reviewed topical treatment to the area at night so it has all evening to absorbed into the skin and dry before applying socks and shoes and next morning  Had a lengthy discussion regarding appropriate tennis shoe, wider toe box  Patient is trying to get diabetic shoes also  Instructed to use over-the-counter Voltaren gel/arthritic pain cream over this joint as directed for any discomfort regarding the 5th digit right foot, arthritis in her feet or hammertoes   Reviewed care and maintenance of skin  Reviewed better care and maintenance of nails  Reviewed potential complications  Nails debrided, thickness reduced  Reviewed diabetic education pertaining to feet  Reviewed need for daily foot checks and instructed patient to contact the office with any area of concern which has not improved in a few days  Patient was in understanding and agreement with treatment plan.  I counseled the patient on their conditions, implications and medical management.  Instructed patient to contact the office with any changes, questions, concerns, worsening of symptoms.   Total face to face time 30 minutes, exam, assessment, treatment, discussion, additional time for review of chart prior to and following appointment and visit documentation, consultation and coordination of care.    Follow up 3 months    This note was created using M*Modal voice recognition software that occasionally  misinterpreted phrases or words.

## 2025-07-29 ENCOUNTER — OFFICE VISIT (OUTPATIENT)
Dept: PODIATRY | Facility: CLINIC | Age: 70
End: 2025-07-29
Payer: MEDICAID

## 2025-07-29 VITALS
HEART RATE: 91 BPM | WEIGHT: 200.63 LBS | DIASTOLIC BLOOD PRESSURE: 77 MMHG | SYSTOLIC BLOOD PRESSURE: 124 MMHG | BODY MASS INDEX: 31.42 KG/M2

## 2025-07-29 DIAGNOSIS — M20.42 HAMMERTOES OF BOTH FEET: ICD-10-CM

## 2025-07-29 DIAGNOSIS — E11.9 CONTROLLED TYPE 2 DIABETES MELLITUS WITHOUT COMPLICATION, WITHOUT LONG-TERM CURRENT USE OF INSULIN: ICD-10-CM

## 2025-07-29 DIAGNOSIS — M20.5X1 ACQUIRED ADDUCTOVARUS ROTATION OF TOE OF RIGHT FOOT: Primary | ICD-10-CM

## 2025-07-29 DIAGNOSIS — M20.41 HAMMERTOES OF BOTH FEET: ICD-10-CM

## 2025-07-29 DIAGNOSIS — B35.1 ONYCHOMYCOSIS OF TOENAIL: ICD-10-CM

## 2025-07-29 DIAGNOSIS — L84 PRE-ULCERATIVE CALLUSES: ICD-10-CM

## 2025-07-29 PROCEDURE — 99214 OFFICE O/P EST MOD 30 MIN: CPT | Mod: PBBFAC | Performed by: PODIATRIST

## 2025-07-29 PROCEDURE — 99213 OFFICE O/P EST LOW 20 MIN: CPT | Mod: S$PBB,,, | Performed by: PODIATRIST

## 2025-07-29 PROCEDURE — 3008F BODY MASS INDEX DOCD: CPT | Mod: CPTII,,, | Performed by: PODIATRIST

## 2025-07-29 PROCEDURE — 3074F SYST BP LT 130 MM HG: CPT | Mod: CPTII,,, | Performed by: PODIATRIST

## 2025-07-29 PROCEDURE — 4010F ACE/ARB THERAPY RXD/TAKEN: CPT | Mod: CPTII,,, | Performed by: PODIATRIST

## 2025-07-29 PROCEDURE — 1101F PT FALLS ASSESS-DOCD LE1/YR: CPT | Mod: CPTII,,, | Performed by: PODIATRIST

## 2025-07-29 PROCEDURE — 99999 PR PBB SHADOW E&M-EST. PATIENT-LVL IV: CPT | Mod: PBBFAC,,, | Performed by: PODIATRIST

## 2025-07-29 PROCEDURE — 3078F DIAST BP <80 MM HG: CPT | Mod: CPTII,,, | Performed by: PODIATRIST

## 2025-07-29 PROCEDURE — 1159F MED LIST DOCD IN RCRD: CPT | Mod: CPTII,,, | Performed by: PODIATRIST

## 2025-07-29 PROCEDURE — 3288F FALL RISK ASSESSMENT DOCD: CPT | Mod: CPTII,,, | Performed by: PODIATRIST

## 2025-07-29 PROCEDURE — 1126F AMNT PAIN NOTED NONE PRSNT: CPT | Mod: CPTII,,, | Performed by: PODIATRIST

## 2025-07-29 RX ORDER — BACLOFEN 5 MG/1
5 TABLET ORAL
COMMUNITY
Start: 2025-07-07

## 2025-07-29 RX ORDER — TIRZEPATIDE 5 MG/.5ML
INJECTION, SOLUTION SUBCUTANEOUS
COMMUNITY
Start: 2025-05-27

## 2025-07-29 NOTE — PROGRESS NOTES
Subjective:       Patient ID: Leonie Muir is a 69 y.o. female.    Chief Complaint: Diabetes Mellitus, Follow-up, and Toe Pain    CHIEF COMPLAINT:  - Follow-up for diabetic foot care and diabetic shoes, pain 5th digit right foot    HPI:  Leonie presents for follow-up regarding diabetes management. She reports her diabetes is well-controlled. She denies any recent labs but will be seeing this medical assistant next week for this. She denies any burning or tingling sensations in her feet. She has been wearing tennis shoes most of the time and inquires about obtaining diabetic shoes. She is currently under the care of a nurse practitioner specializing in diabetes management and plans to ask about diabetic shoes at her upcoming appointment next week.      ROS:  Musculoskeletal: -limb pain      Past Medical History:   Diagnosis Date    Diabetes mellitus, type 2     Hyperlipidemia     Hypertension     Stroke 2022     Past Surgical History:   Procedure Laterality Date     SECTION       Family History   Problem Relation Name Age of Onset    Diabetes Mother      Psychosis Father      Diabetes Sister      Diabetes Brother       Social History     Socioeconomic History    Marital status:    Tobacco Use    Smoking status: Never    Smokeless tobacco: Never   Substance and Sexual Activity    Alcohol use: Not Currently    Drug use: Not Currently    Sexual activity: Not Currently       Current Outpatient Medications   Medication Sig Dispense Refill    amLODIPine (NORVASC) 10 MG tablet Take 10 mg by mouth.      aspirin (ECOTRIN) 81 MG EC tablet Take 81 mg by mouth.      atorvastatin (LIPITOR) 20 MG tablet = 1 tab, Oral, HS, # 90 tab, 1 Refill(s), Maintenance, Pharmacy: [facility], 172.7, cm, 25 8:36:00 CDT, Height/Length Measured, 93.2, kg, 25 8:36:00 CDT, Weight Dosing      baclofen (LIORESAL) 5 mg Tab tablet Take 5 mg by mouth.      blood-glucose sensor (FREESTYLE SOPHIA 2 PLUS SENSOR [facility])  Freestyle [place] 2 Sensors, See Instructions, Freestyle Ernesto 2 Sensors  14 day use  Diagnosis Type 2 Diabetes E11.9, # 6 EA, 4 Refill(s), Pharmacy: [facility], 172.7, cm, 11/04/24 8:21:00 CST, Height/Length Measured, 92.1, kg, 11/04/24 8:21:00 CST, Weight Dosing      cinnamon bark 500 mg capsule Take 500 mg by mouth.      dapagliflozin propanediol (FARXIGA) 10 mg tablet 10 mg.      ELDERBERRY FRUIT ORAL Take by mouth.      flash glucose scanning reader (FREESTYLE ERNESTO 2 READER MISC) Freestyle Ernesto 2 [place]/KIT, See Instructions, Freestyle Ernesto 2 reader/KIT  Patient to test glucose  Diagnosis Type 2 diabets, # 1 EA, 0 Refill(s), Pharmacy: [facility], 172.7, cm, 11/04/24 8:21:00 CST, Height/Length Measured, 92.1, kg, 11/04/24 8:21:00 CST, Weight Dosing      FREESTYLE ERNESTO 2 SENSOR Kit USE 1 SENSOR AND CHANGE EVERY 14 DAYS      glimepiride (AMARYL) 2 MG tablet Take 2 mg by mouth every morning.      metoprolol succinate (TOPROL-XL) 25 MG 24 hr tablet Take 25 mg by mouth.      MOUNJARO 5 mg/0.5 mL PnIj See Instructions, INJECT 5 MG SUBCUATANEOUSLY ONCE A WEEK ROTATE INJECTION SITSE, # 4 mL, 2 Refill(s), Maintenance, Pharmacy: [facility], 172.7, cm, 05/23/25 10:59:00 CDT, Height/Length Measured, 85.9, kg, 05/23/25 10:59:00 CDT, Weight Dosing      olmesartan (BENICAR) 20 MG tablet 20 mg.      blood-glucose meter Misc 1 kit by NOT APPLICABLE route.      metFORMIN (GLUCOPHAGE) 500 MG tablet Take 500 mg by mouth 2 (two) times daily with meals.       No current facility-administered medications for this visit.     Review of patient's allergies indicates:   Allergen Reactions    Metoprolol Itching and Rash     Pt stated she just started this medication and is itching and has a new rash on her back.        Review of Systems   Cardiovascular:  Negative for leg swelling.   Musculoskeletal:  Positive for gait problem.        Cane   Neurological:  Positive for weakness.   All other systems reviewed  and are negative.      Objective:      Vitals:    07/29/25 0819   BP: 124/77   Pulse: 91   Weight: 91 kg (200 lb 9.9 oz)     Physical Exam  Vitals and nursing note reviewed.   Constitutional:       General: She is not in acute distress.     Appearance: Normal appearance.   Cardiovascular:      Pulses:           Dorsalis pedis pulses are 2+ on the right side and 2+ on the left side.        Posterior tibial pulses are 1+ on the right side and 1+ on the left side.   Pulmonary:      Effort: Pulmonary effort is normal.   Musculoskeletal:      Right foot: Decreased range of motion (Significant limitation range of motion 1st MPJ bilateral, hammertoes bilateral/2nd digit rigid bilateral, arthritic and degenerative changes forefoot bilateral). Deformity (Adductovarus deformity 5th digit right) present.      Left foot: Decreased range of motion. Deformity present.   Feet:      Right foot:      Skin integrity: Callus (Preulcerative callus lateral 5th PIPJ, tender, dry and stable) present.      Toenail Condition: Right toenails are abnormally thick. Fungal disease present.     Left foot:      Toenail Condition: Left toenails are abnormally thick. Fungal disease present.  Skin:     Capillary Refill: Capillary refill takes 2 to 3 seconds.   Neurological:      General: No focal deficit present.      Mental Status: She is alert.      Comments: Sensation intact all areas bilateral feet, no pain/paresthesias bilateral feet   Psychiatric:         Behavior: Behavior normal.         Thought Content: Thought content normal.       FROM CHEMISTRY PANEL [facility] 03/18/2025  Hgb A1c [0.0-5.9 %] 8.2 % 2  *H*  (3/18/25 9:08 AM)                  Assessment:       1. 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 Pre-ulcerative calluses - Left Foot    3. Controlled type 2 diabetes mellitus without complication, without long-term current use of insulin    4. Hammertoes of both feet    5. Onychomycosis of toenail               Plan:             Reviewed arthritis in feet, iván  Reviewed rotation deformity 5th digit right and pressure causing a pre ulcerative callus  Area was debrided with no complications at this time but we discussed at length what to monitor for regarding any changes  Reviewed care and maintenance try to prevent recurrence  Reviewed padding, callus cushion  Discussed changing shoes wide large deep toe box, example [place]  Reviewed use of Voltaren gel/arthritic pain cream over this joint as directed for any discomfort   Reviewed care and maintenance of skin  Reviewed better care and maintenance of nails  Reviewed potential complications  Nails debrided, thickness reduced  Reviewed diabetic education pertaining to feet  Reviewed need for daily foot checks and instructed patient to contact the office with any area of concern which has not improved in a few days  Patient was in understanding and agreement with treatment plan.  I counseled the patient on their conditions, implications and medical management.  Instructed patient to contact the office with any changes, questions, concerns, worsening of symptoms.   Total face to face time 20 minutes, exam, assessment, treatment, discussion, additional time for review of chart prior to and following appointment and visit documentation, consultation and coordination of care.    Follow up prn 3 months    This note was created using M*Modal voice recognition software that occasionally misinterpreted phrases or words.